# Patient Record
Sex: MALE | Race: WHITE | NOT HISPANIC OR LATINO | URBAN - METROPOLITAN AREA
[De-identification: names, ages, dates, MRNs, and addresses within clinical notes are randomized per-mention and may not be internally consistent; named-entity substitution may affect disease eponyms.]

---

## 2022-06-29 ENCOUNTER — EMERGENCY (EMERGENCY)
Facility: HOSPITAL | Age: 27
LOS: 1 days | Discharge: HOME | End: 2022-06-29
Attending: EMERGENCY MEDICINE
Payer: MEDICARE

## 2022-06-29 VITALS
TEMPERATURE: 98 F | DIASTOLIC BLOOD PRESSURE: 85 MMHG | SYSTOLIC BLOOD PRESSURE: 128 MMHG | WEIGHT: 188.94 LBS | OXYGEN SATURATION: 100 % | HEART RATE: 69 BPM | HEIGHT: 73 IN | RESPIRATION RATE: 18 BRPM

## 2022-06-29 PROCEDURE — 90715 TDAP VACCINE 7 YRS/> IM: CPT

## 2022-06-29 PROCEDURE — 99283 EMERGENCY DEPT VISIT LOW MDM: CPT | Mod: FS

## 2022-06-29 PROCEDURE — 99283 EMERGENCY DEPT VISIT LOW MDM: CPT | Mod: 25

## 2022-06-29 PROCEDURE — 90471 IMMUNIZATION ADMIN: CPT

## 2022-06-29 RX ORDER — TETANUS TOXOID, REDUCED DIPHTHERIA TOXOID AND ACELLULAR PERTUSSIS VACCINE, ADSORBED 5; 2.5; 8; 8; 2.5 [IU]/.5ML; [IU]/.5ML; UG/.5ML; UG/.5ML; UG/.5ML
0.5 SUSPENSION INTRAMUSCULAR ONCE
Refills: 0 | Status: COMPLETED | OUTPATIENT
Start: 2022-06-29 | End: 2022-06-29

## 2022-06-29 RX ADMIN — Medication 1 TABLET(S): at 19:45

## 2022-06-29 RX ADMIN — TETANUS TOXOID, REDUCED DIPHTHERIA TOXOID AND ACELLULAR PERTUSSIS VACCINE, ADSORBED 0.5 MILLILITER(S): 5; 2.5; 8; 8; 2.5 SUSPENSION INTRAMUSCULAR at 19:44

## 2022-06-29 NOTE — ED ADULT NURSE NOTE - OBJECTIVE STATEMENT
Assumed care of pt in ST. Pt A&Ox4 c/o lft shin dog bite. Bleeding controlled, unknown last tetanus vaccination. Pt denies fevers/chills/purulent drainage from site.

## 2022-06-29 NOTE — ED PROVIDER NOTE - NS ED ATTENDING STATEMENT MOD
This was a shared visit with the JATIN. I reviewed and verified the documentation and independently performed the documented:

## 2022-06-29 NOTE — ED PROVIDER NOTE - PATIENT PORTAL LINK FT
You can access the FollowMyHealth Patient Portal offered by [facility] by registering at the following website: [email or web address]. By joining [employer]’s FollowMyHealth portal, you will also be able to view your health information using other applications (apps) compatible with our system.

## 2022-06-29 NOTE — ED ADULT TRIAGE NOTE - CHIEF COMPLAINT QUOTE
pt states got a dog bite to left lower leg,( dad present son has autism)  A&Ox3, resp wnl, small bite noted to leg

## 2022-06-29 NOTE — ED PROVIDER NOTE - ATTENDING APP SHARED VISIT CONTRIBUTION OF CARE
The patient seen and examined    Dog Bite    I, Gonzalo Kraft, performed the initial face to face bedside interview with this patient regarding history of present illness, review of symptoms and relevant past medical, social and family history.  I completed an independent physical examination.  I was the initial provider who evaluated this patient. I have signed out the follow up of any pending tests (i.e. labs, radiological studies) to the ACP.  I have communicated the patient’s plan of care and disposition with the ACP.

## 2022-06-29 NOTE — ED PROVIDER NOTE - NSFOLLOWUPINSTRUCTIONS_ED_ALL_ED_FT
Animal Bite, Adult      Animal bites range from mild to serious. An animal bite can result in any of these injuries:  •A scratch.      •A deep, open cut.      •A puncture of the skin.      •A crush injury.      •Tearing away of the skin or a body part.      •A bone injury.      A small bite from a house pet is usually less serious than a bite from a stray or wild animal, such as a raccoon, shrestha, skunk, or bat. That is because stray and wild animals have a higher risk of carrying a serious infection called rabies, which can be passed to humans through a bite.      What increases the risk?    You are more likely to be bitten by an animal if:  •You are around unfamiliar pets.      •You disturb an animal when it is eating, sleeping, or caring for its babies.      •You are outdoors in a place where small, wild animals roam freely.        What are the signs or symptoms?    Common symptoms of an animal bite include:  •Pain.      •Bleeding.      •Swelling.      •Bruising.        How is this diagnosed?    This condition may be diagnosed based on a physical exam and medical history. Your health care provider will examine your wound and ask for details about the animal and how the bite happened. You may also have tests, such as:  •Blood tests to check for infection.      •X-rays to check for damage to bones or joints.      •Taking a fluid sample from your wound and checking it for infection (culture test).        How is this treated?    Treatment varies depending on the type of animal, where the bite is on your body, and your medical history. Treatment may include:  •Caring for the wound. This often includes cleaning the wound, rinsing out (flushing) the wound with saline solution, and applying a bandage (dressing). In some cases, the wound may be closed with stitches (sutures), staples, skin glue, or adhesive strips.      •Antibiotic medicine to prevent or treat infection. This medicine may be prescribed in pill or ointment form. If the bite area becomes infected, the medicine may be given through an IV.      •A tetanus shot to prevent tetanus infection.      •Rabies treatment to prevent rabies infection. This will be done if the animal could have rabies.      •Surgery. This may be done if a bite gets infected or if there is damage that needs to be repaired.        Follow these instructions at home:      Wound care    •Follow instructions from your health care provider about how to take care of your wound. Make sure you:  •Wash your hands with soap and water before you change your dressing. If soap and water are not available, use hand .      •Change your dressing as told by your health care provider.      •Leave sutures, skin glue, or adhesive strips in place. These skin closures may need to stay in place for 2 weeks or longer. If adhesive strip edges start to loosen and curl up, you may trim the loose edges. Do not remove adhesive strips completely unless your health care provider tells you to do that.      •Check your wound every day for signs of infection. Check for:  •More redness, swelling, or pain.      •More fluid or blood.      •Warmth.      •Pus or a bad smell.        Medicines     •Take or apply over-the-counter and prescription medicines only as told by your health care provider.      •If you were prescribed an antibiotic, take or apply it as told by your health care provider. Do not stop using the antibiotic even if your condition improves.        General instructions      •Keep the injured area raised (elevated) above the level of your heart while you are sitting or lying down, if this is possible.    •If directed, put ice on the injured area.  •Put ice in a plastic bag.      •Place a towel between your skin and the bag.      •Leave the ice on for 20 minutes, 2–3 times per day.        •Keep all follow-up visits as told by your health care provider. This is important.        Contact a health care provider if:    •You have more redness, swelling, or pain around your wound.      •Your wound feels warm to the touch.      •You have a fever or chills.      •You have a general feeling of sickness (malaise).      •You feel nauseous or you vomit.      •You have pain that does not get better.        Get help right away if:    •You have a red streak that leads away from your wound.      •You have non-clear fluid or more blood coming from your wound.      •There is pus or a bad smell coming from your wound.      •You have trouble moving your injured area.      •You have numbness or tingling that extends beyond the wound.        Summary    •Animal bites can range from mild to serious. An animal bite can cause a scratch on the skin, a deep open cut, a puncture of the skin, a crush injury, tearing away of the skin or a body part, or a bone injury.      •Your health care provider will examine your wound and ask for details about the animal and how the bite happened.      •You may also have tests such as a blood test, X-ray, or testing of a fluid sample from your wound (culture test).      •Treatment may include wound care, antibiotic medicine, a tetanus shot, and rabies treatment if the animal could have rabies.      This information is not intended to replace advice given to you by your health care provider. Make sure you discuss any questions you have with your health care provider.

## 2022-06-29 NOTE — ED PROVIDER NOTE - OBJECTIVE STATEMENT
PT with no SPMHx presents to the ED with complaint of dog bit to his Lt lower leg that occurred 3Hr PTA. PT states that he was at his building complex when he was bit by another tenants dog. Pt states that he had a sudden onset of mild localized pain that resoled wo intervention, felt sharp has not been made better or worse by anything. Pt states that he cleaned wound prior to arivle. Pt dines fever, chills, numbness, tingling HA, dizziness, SOB, diff breathing.

## 2022-06-29 NOTE — ED PROVIDER NOTE - CLINICAL SUMMARY MEDICAL DECISION MAKING FREE TEXT BOX
PT with stable VS, no acute distress, non toxic appearing, tolerating PO in the ED, pt with superficial wound not requiring closure, Pt vaccination updated, Pt bitten by domesticated dog owner can be contacted and dog can be observed, Pt to be dc home on PO ABx, follow up to PCP, educated about when to return to the ED if needed. PT verbalizes that he understands all instructions and results. Pt informed that ED is open and available 24/7 365 days a yr, encouraged to return to the ED if they have any change in condition, or feel the need for revaluation.

## 2022-06-29 NOTE — ED PROVIDER NOTE - ADDITIONAL NOTES AND INSTRUCTIONS:
PT was evaluated At [facility] ED and was found to have a condition that warranted time of to rest and heal from WORK/SCHOOL.   Catalino Freeman PA-C

## 2022-06-29 NOTE — ED PROVIDER NOTE - NS ED ROS FT
ROS: CONTUSIONAL: Denies fever, chills, fatigue, wt loss. HEAD: Denies trauma, HA, Dizziness. EYE: Denies Acute visual changes, diplopia. ENMT: Denies change in hearing, tinnitus, epistaxis, difficulty swallowing, sore throat. CARDIO: Denies CP, palpitations, edema. RESP: Denies Cough, SOB , Diff breathing, hemoptysis. GI: Denies N/V, ABD pain, change in bowel movement. URINARY: Denies difficulty urinating, pelvic pain. MS:  Denies joint pain, back pain, weakness, decreased ROM, swelling. NEURO: Denies change in gait, seizures, loss of sensation, dizziness, confusion LOC.  PSY: NO SI/HI. SKIN: +abrasion  Denies Rash, bruising.